# Patient Record
Sex: FEMALE | Race: WHITE | NOT HISPANIC OR LATINO | Employment: FULL TIME | URBAN - NONMETROPOLITAN AREA
[De-identification: names, ages, dates, MRNs, and addresses within clinical notes are randomized per-mention and may not be internally consistent; named-entity substitution may affect disease eponyms.]

---

## 2024-02-08 PROBLEM — D68.51 FACTOR V LEIDEN (MULTI): Status: ACTIVE | Noted: 2024-02-08

## 2024-02-08 PROBLEM — R00.2 PALPITATIONS: Status: ACTIVE | Noted: 2024-02-08

## 2024-02-08 PROBLEM — Z86.711 HISTORY OF PULMONARY EMBOLUS (PE): Status: ACTIVE | Noted: 2024-02-08

## 2024-02-08 PROBLEM — R42 LIGHT-HEADEDNESS: Status: ACTIVE | Noted: 2024-02-08

## 2024-02-08 PROBLEM — E78.5 HYPERLIPEMIA: Status: ACTIVE | Noted: 2024-02-08

## 2024-02-08 PROBLEM — G47.33 OBSTRUCTIVE SLEEP APNEA: Status: ACTIVE | Noted: 2024-02-08

## 2024-02-08 PROBLEM — I10 HYPERTENSION, BENIGN: Status: ACTIVE | Noted: 2024-02-08

## 2024-02-08 PROBLEM — R07.89 CHEST TIGHTNESS: Status: ACTIVE | Noted: 2024-02-08

## 2024-02-08 PROBLEM — R53.83 FATIGUE: Status: ACTIVE | Noted: 2024-02-08

## 2024-02-08 RX ORDER — VALACYCLOVIR HYDROCHLORIDE 1 G/1
1 TABLET, FILM COATED ORAL DAILY
COMMUNITY

## 2024-02-08 RX ORDER — CETIRIZINE HYDROCHLORIDE 10 MG/1
1 TABLET ORAL DAILY
COMMUNITY
End: 2024-05-01 | Stop reason: ALTCHOICE

## 2024-02-08 RX ORDER — LEVOTHYROXINE SODIUM 75 UG/1
1 TABLET ORAL DAILY
COMMUNITY
Start: 2021-06-14

## 2024-02-08 RX ORDER — LOSARTAN POTASSIUM 25 MG/1
1 TABLET ORAL DAILY
COMMUNITY
Start: 2022-06-14 | End: 2024-03-26

## 2024-02-08 RX ORDER — ATORVASTATIN CALCIUM 10 MG/1
1 TABLET, FILM COATED ORAL DAILY
COMMUNITY

## 2024-02-08 RX ORDER — ACETAMINOPHEN 500 MG
2000 TABLET ORAL DAILY
COMMUNITY

## 2024-03-21 DIAGNOSIS — I10 HYPERTENSION, BENIGN: ICD-10-CM

## 2024-03-26 RX ORDER — LOSARTAN POTASSIUM 25 MG/1
25 TABLET ORAL DAILY
Qty: 90 TABLET | Refills: 3 | Status: SHIPPED | OUTPATIENT
Start: 2024-03-26

## 2024-05-01 ENCOUNTER — OFFICE VISIT (OUTPATIENT)
Dept: CARDIOLOGY | Facility: CLINIC | Age: 64
End: 2024-05-01
Payer: MEDICARE

## 2024-05-01 VITALS
HEART RATE: 64 BPM | SYSTOLIC BLOOD PRESSURE: 120 MMHG | HEIGHT: 64 IN | BODY MASS INDEX: 24.75 KG/M2 | WEIGHT: 145 LBS | DIASTOLIC BLOOD PRESSURE: 90 MMHG

## 2024-05-01 DIAGNOSIS — Z86.711 HISTORY OF PULMONARY EMBOLUS (PE): ICD-10-CM

## 2024-05-01 DIAGNOSIS — D68.51 FACTOR V LEIDEN (MULTI): ICD-10-CM

## 2024-05-01 DIAGNOSIS — E78.2 MIXED HYPERLIPIDEMIA: ICD-10-CM

## 2024-05-01 DIAGNOSIS — Z78.9 NEVER SMOKED TOBACCO: ICD-10-CM

## 2024-05-01 DIAGNOSIS — I10 HYPERTENSION, BENIGN: ICD-10-CM

## 2024-05-01 PROCEDURE — 1160F RVW MEDS BY RX/DR IN RCRD: CPT | Performed by: INTERNAL MEDICINE

## 2024-05-01 PROCEDURE — 3074F SYST BP LT 130 MM HG: CPT | Performed by: INTERNAL MEDICINE

## 2024-05-01 PROCEDURE — 1159F MED LIST DOCD IN RCRD: CPT | Performed by: INTERNAL MEDICINE

## 2024-05-01 PROCEDURE — 1036F TOBACCO NON-USER: CPT | Performed by: INTERNAL MEDICINE

## 2024-05-01 PROCEDURE — 3080F DIAST BP >= 90 MM HG: CPT | Performed by: INTERNAL MEDICINE

## 2024-05-01 PROCEDURE — 99213 OFFICE O/P EST LOW 20 MIN: CPT | Performed by: INTERNAL MEDICINE

## 2024-05-01 PROCEDURE — 3008F BODY MASS INDEX DOCD: CPT | Performed by: INTERNAL MEDICINE

## 2024-05-01 ASSESSMENT — ENCOUNTER SYMPTOMS: DIZZINESS: 1

## 2024-05-01 NOTE — LETTER
"May 1, 2024     Edna Kearns MD  [address] [facility] [address]    Patient: Mirna Snider   YOB: 1960   Date of Visit: 5/1/2024       Dear Dr. Edna Kearns MD:    Thank you for referring Mirna Snider to me for evaluation. Below are my notes for this consultation.  If you have questions, please do not hesitate to call me. I look forward to following your patient along with you.       Sincerely,     Gaetano Rinaldi, DO      CC: No Recipients  ______________________________________________________________________________________    Subjective   Mirna Snider is a 63 y.o. female       Chief Complaint    Annual Exam          63-year-old female returns for annual follow-up she is doing well she has had no recurrent bleeding or thromboembolic events.  Her younger brother has sustained further lower extremity thromboembolism this past year    She sustained large pulmonary embolism in 2020 treated with catheter-based thrombolytic therapy successfully with resolution of RV and LV dysfunction. She remains on lifelong DOAC therapy. She has underlying factor V Leiden deficiency and a family history of precocious thromboembolic events.    Recommendations: Continue lifelong DOAC therapy, follow-up in 1 year         Review of Systems   Neurological:  Positive for dizziness.   All other systems reviewed and are negative.           Vitals:    05/01/24 0930   BP: 120/90   BP Location: Left arm   Patient Position: Sitting   Pulse: 64   Weight: 65.8 kg (145 lb)   Height: 1.626 m (5' 4\")        Objective   Physical Exam  Constitutional:       Appearance: Normal appearance.   HENT:      Nose: Nose normal.   Neck:      Vascular: No carotid bruit.   Cardiovascular:      Rate and Rhythm: Normal rate.      Pulses: Normal pulses.      Heart sounds: Normal heart sounds.   Pulmonary:      Effort: Pulmonary effort is normal.   Abdominal:      General: Bowel sounds are normal.      Palpations: Abdomen is soft. "   Musculoskeletal:         General: Normal range of motion.      Cervical back: Normal range of motion.      Right lower leg: No edema.      Left lower leg: No edema.   Skin:     General: Skin is warm and dry.   Neurological:      General: No focal deficit present.      Mental Status: She is alert.   Psychiatric:         Mood and Affect: Mood normal.         Behavior: Behavior normal.         Thought Content: Thought content normal.         Judgment: Judgment normal.         Allergies  Patient has no known allergies.     Current Medications    Current Outpatient Medications:   •  atorvastatin (Lipitor) 10 mg tablet, Take 1 tablet (10 mg) by mouth once daily., Disp: , Rfl:   •  cholecalciferol (Vitamin D-3) 50 mcg (2,000 unit) capsule, Take 1 capsule (50 mcg) by mouth once daily., Disp: , Rfl:   •  levothyroxine (Synthroid) 75 mcg tablet, Take 1 tablet (75 mcg) by mouth once daily., Disp: , Rfl:   •  losartan (Cozaar) 25 mg tablet, TAKE 1 TABLET BY MOUTH DAILY, Disp: 90 tablet, Rfl: 3  •  multivit-min/ferrous fumarate (MULTI VITAMIN ORAL), Take 1 tablet by mouth once daily., Disp: , Rfl:   •  rivaroxaban (Xarelto) 20 mg tablet, Take 1 tablet (20 mg) by mouth once daily., Disp: , Rfl:   •  valACYclovir (Valtrex) 1 gram tablet, Take 1 tablet (1,000 mg) by mouth once daily., Disp: , Rfl:                      Assessment/Plan   1. History of pulmonary embolus (PE)        2. Factor V Leiden (Multi)        3. Hypertension, benign        4. Mixed hyperlipidemia        5. BMI 24.0-24.9, adult        6. Never smoked tobacco                 Scribe Attestation  By signing my name below, Alyssa GRIGGS LPN [employer]   attest that this documentation has been prepared under the direction and in the presence of Gaetano Rinaldi DO.     Provider Attestation - Scribe documentation    All medical record entries made by the Scribe were at my direction and personally dictated by me. I have reviewed the chart and agree that the  record accurately reflects my personal performance of the history, physical exam, discussion and plan.

## 2024-05-01 NOTE — PROGRESS NOTES
"Ariella Snider is a 63 y.o. female       Chief Complaint    Annual Exam          63-year-old female returns for annual follow-up she is doing well she has had no recurrent bleeding or thromboembolic events.  Her younger brother has sustained further lower extremity thromboembolism this past year    She sustained large pulmonary embolism in 2020 treated with catheter-based thrombolytic therapy successfully with resolution of RV and LV dysfunction. She remains on lifelong DOAC therapy. She has underlying factor V Leiden deficiency and a family history of precocious thromboembolic events.    Recommendations: Continue lifelong DOAC therapy, follow-up in 1 year         Review of Systems   Neurological:  Positive for dizziness.   All other systems reviewed and are negative.           Vitals:    05/01/24 0930   BP: 120/90   BP Location: Left arm   Patient Position: Sitting   Pulse: 64   Weight: 65.8 kg (145 lb)   Height: 1.626 m (5' 4\")        Objective   Physical Exam  Constitutional:       Appearance: Normal appearance.   HENT:      Nose: Nose normal.   Neck:      Vascular: No carotid bruit.   Cardiovascular:      Rate and Rhythm: Normal rate.      Pulses: Normal pulses.      Heart sounds: Normal heart sounds.   Pulmonary:      Effort: Pulmonary effort is normal.   Abdominal:      General: Bowel sounds are normal.      Palpations: Abdomen is soft.   Musculoskeletal:         General: Normal range of motion.      Cervical back: Normal range of motion.      Right lower leg: No edema.      Left lower leg: No edema.   Skin:     General: Skin is warm and dry.   Neurological:      General: No focal deficit present.      Mental Status: She is alert.   Psychiatric:         Mood and Affect: Mood normal.         Behavior: Behavior normal.         Thought Content: Thought content normal.         Judgment: Judgment normal.         Allergies  Patient has no known allergies.     Current Medications    Current Outpatient " Medications:     atorvastatin (Lipitor) 10 mg tablet, Take 1 tablet (10 mg) by mouth once daily., Disp: , Rfl:     cholecalciferol (Vitamin D-3) 50 mcg (2,000 unit) capsule, Take 1 capsule (50 mcg) by mouth once daily., Disp: , Rfl:     levothyroxine (Synthroid) 75 mcg tablet, Take 1 tablet (75 mcg) by mouth once daily., Disp: , Rfl:     losartan (Cozaar) 25 mg tablet, TAKE 1 TABLET BY MOUTH DAILY, Disp: 90 tablet, Rfl: 3    multivit-min/ferrous fumarate (MULTI VITAMIN ORAL), Take 1 tablet by mouth once daily., Disp: , Rfl:     rivaroxaban (Xarelto) 20 mg tablet, Take 1 tablet (20 mg) by mouth once daily., Disp: , Rfl:     valACYclovir (Valtrex) 1 gram tablet, Take 1 tablet (1,000 mg) by mouth once daily., Disp: , Rfl:                      Assessment/Plan   1. History of pulmonary embolus (PE)        2. Factor V Leiden (Multi)        3. Hypertension, benign        4. Mixed hyperlipidemia        5. BMI 24.0-24.9, adult        6. Never smoked tobacco                 Scribe Attestation  By signing my name below, IAlyssa LPN, Scribe   attest that this documentation has been prepared under the direction and in the presence of Gaetano Rinaldi DO.     Provider Attestation - Scribe documentation    All medical record entries made by the Scribe were at my direction and personally dictated by me. I have reviewed the chart and agree that the record accurately reflects my personal performance of the history, physical exam, discussion and plan.

## 2025-05-01 ENCOUNTER — APPOINTMENT (OUTPATIENT)
Dept: CARDIOLOGY | Facility: CLINIC | Age: 65
End: 2025-05-01
Payer: COMMERCIAL

## 2025-05-30 DIAGNOSIS — I10 HYPERTENSION, BENIGN: ICD-10-CM

## 2025-06-02 RX ORDER — LOSARTAN POTASSIUM 25 MG/1
25 TABLET ORAL DAILY
Qty: 90 TABLET | Refills: 3 | Status: SHIPPED | OUTPATIENT
Start: 2025-06-02 | End: 2026-06-02

## 2025-08-07 ENCOUNTER — APPOINTMENT (OUTPATIENT)
Dept: CARDIOLOGY | Facility: CLINIC | Age: 65
End: 2025-08-07
Payer: MEDICARE

## 2025-08-07 VITALS
BODY MASS INDEX: 26.12 KG/M2 | HEART RATE: 64 BPM | WEIGHT: 153 LBS | SYSTOLIC BLOOD PRESSURE: 128 MMHG | DIASTOLIC BLOOD PRESSURE: 82 MMHG | HEIGHT: 64 IN

## 2025-08-07 DIAGNOSIS — D68.51 FACTOR V LEIDEN (MULTI): ICD-10-CM

## 2025-08-07 DIAGNOSIS — Z86.711 HISTORY OF PULMONARY EMBOLUS (PE): ICD-10-CM

## 2025-08-07 DIAGNOSIS — G47.33 OBSTRUCTIVE SLEEP APNEA: ICD-10-CM

## 2025-08-07 DIAGNOSIS — I10 HYPERTENSION, BENIGN: ICD-10-CM

## 2025-08-07 DIAGNOSIS — Z78.9 NEVER SMOKED TOBACCO: ICD-10-CM

## 2025-08-07 PROCEDURE — 1159F MED LIST DOCD IN RCRD: CPT | Performed by: INTERNAL MEDICINE

## 2025-08-07 PROCEDURE — 1036F TOBACCO NON-USER: CPT | Performed by: INTERNAL MEDICINE

## 2025-08-07 PROCEDURE — 3074F SYST BP LT 130 MM HG: CPT | Performed by: INTERNAL MEDICINE

## 2025-08-07 PROCEDURE — 3079F DIAST BP 80-89 MM HG: CPT | Performed by: INTERNAL MEDICINE

## 2025-08-07 PROCEDURE — 3008F BODY MASS INDEX DOCD: CPT | Performed by: INTERNAL MEDICINE

## 2025-08-07 PROCEDURE — 1160F RVW MEDS BY RX/DR IN RCRD: CPT | Performed by: INTERNAL MEDICINE

## 2025-08-07 PROCEDURE — 99213 OFFICE O/P EST LOW 20 MIN: CPT | Performed by: INTERNAL MEDICINE

## 2025-08-07 RX ORDER — ATORVASTATIN CALCIUM 20 MG/1
20 TABLET, FILM COATED ORAL DAILY
COMMUNITY

## 2025-08-07 NOTE — LETTER
"August 7, 2025     Edna Kearns MD  [address] [facility] [address]    Patient: Mirna Snider   YOB: 1960   Date of Visit: 8/7/2025       Dear Dr. Edna Kearns MD:    Thank you for referring Mirna Snider to me for evaluation. Below are my notes for this consultation.  If you have questions, please do not hesitate to call me. I look forward to following your patient along with you.       Sincerely,     Gaetano Rinaldi, DO      CC: No Recipients  ______________________________________________________________________________________    Chief Complaint   Patient presents with   • Follow-up     1 year Follow up for Hypertension        Subjective   Mirna Snider is a 64 y.o. female     64-year-old female returns for annual cardiovascular follow-up and doing very well.  She denies any cardiovascular events or complaints or nitrate usage or hospitalizations.  She underwent inspire treatment for sleep apnea with improved blood pressure and now is off losartan altogether given the normalization of her blood pressure.  She still uses oxygen at night for hypoxemia.    She remains on lifelong Xarelto given her history of thromboembolism and factor V deficiency.    She is otherwise functional class I presently    Recommendations: Continue current therapies, follow-up in 1 year         Review of Systems   All other systems reviewed and are negative.           Vitals:    08/07/25 1136   BP: 128/82   BP Location: Left arm   Patient Position: Sitting   Pulse: 64   Weight: 69.4 kg (153 lb)   Height: 1.626 m (5' 4\")        Objective   Physical Exam  Constitutional:       Appearance: Normal appearance.   HENT:      Nose: Nose normal.   Neck:      Vascular: No carotid bruit.     Cardiovascular:      Rate and Rhythm: Normal rate.      Pulses: Normal pulses.      Heart sounds: Normal heart sounds.   Pulmonary:      Effort: Pulmonary effort is normal.   Abdominal:      General: Bowel sounds are normal.      " Palpations: Abdomen is soft.     Musculoskeletal:         General: Normal range of motion.      Cervical back: Normal range of motion.      Right lower leg: No edema.      Left lower leg: No edema.     Skin:     General: Skin is warm and dry.     Neurological:      General: No focal deficit present.      Mental Status: She is alert.     Psychiatric:         Mood and Affect: Mood normal.         Behavior: Behavior normal.         Thought Content: Thought content normal.         Judgment: Judgment normal.         Allergies  Patient has no known allergies.     Current Medications  Current Outpatient Medications   Medication Instructions   • atorvastatin (LIPITOR) 20 mg, Daily   • cholecalciferol (VITAMIN D-3) 2,000 Units, Daily   • levothyroxine (Synthroid) 75 mcg tablet 1 tablet, Daily   • multivit-min/ferrous fumarate (MULTI VITAMIN ORAL) 1 tablet, Daily   • oxygen (O2) 2 L/min, Nightly   • rivaroxaban (Xarelto) 20 mg tablet 1 tablet, Daily   • valACYclovir (Valtrex) 1 gram tablet 1 tablet, Daily                        Assessment/Plan   1. History of pulmonary embolus (PE)  Follow Up In Cardiology      2. Factor V Leiden (Multi)  Follow Up In Cardiology      3. Obstructive sleep apnea        4. Hypertension, benign        5. BMI 26.0-26.9,adult        6. Never smoked tobacco                 Scribe Attestation  By signing my name below, ICatherine LPN, Scribe   attest that this documentation has been prepared under the direction and in the presence of Gaetano Rinaldi DO.     Provider Attestation - Scribe documentation    All medical record entries made by the Scribe were at my direction and personally dictated by me. I have reviewed the chart and agree that the record accurately reflects my personal performance of the history, physical exam, discussion and plan.

## 2025-08-07 NOTE — PATIENT INSTRUCTIONS
Please bring all medicines, vitamins, and herbal supplements with you when you come to the office.    Prescriptions will not be filled unless you are compliant with your follow up appointments or have a follow up appointment scheduled as per instruction of your physician. Refills should be requested at the time of your visit.     BMI was above normal measurement. Current weight: 69.4 kg (153 lb)  Weight change since last visit (-) denotes wt loss 8 lbs   Weight loss needed to achieve BMI 25: 7.7 Lbs  Weight loss needed to achieve BMI 30: -21.4 Lbs  Provided instructions on dietary changes  Provided instructions on exercise.    **IF SYSTOLIC BLOOD PRESSURE READING  OR ABOVE, TAKE LOSARTAN**

## 2025-08-07 NOTE — PROGRESS NOTES
"Chief Complaint   Patient presents with    Follow-up     1 year Follow up for Hypertension        Subjective   Mirna Snider is a 64 y.o. female     64-year-old female returns for annual cardiovascular follow-up and doing very well.  She denies any cardiovascular events or complaints or nitrate usage or hospitalizations.  She underwent inspire treatment for sleep apnea with improved blood pressure and now is off losartan altogether given the normalization of her blood pressure.  She still uses oxygen at night for hypoxemia.    She remains on lifelong Xarelto given her history of thromboembolism and factor V deficiency.    She is otherwise functional class I presently    Recommendations: Continue current therapies, follow-up in 1 year         Review of Systems   All other systems reviewed and are negative.           Vitals:    08/07/25 1136   BP: 128/82   BP Location: Left arm   Patient Position: Sitting   Pulse: 64   Weight: 69.4 kg (153 lb)   Height: 1.626 m (5' 4\")        Objective   Physical Exam  Constitutional:       Appearance: Normal appearance.   HENT:      Nose: Nose normal.   Neck:      Vascular: No carotid bruit.     Cardiovascular:      Rate and Rhythm: Normal rate.      Pulses: Normal pulses.      Heart sounds: Normal heart sounds.   Pulmonary:      Effort: Pulmonary effort is normal.   Abdominal:      General: Bowel sounds are normal.      Palpations: Abdomen is soft.     Musculoskeletal:         General: Normal range of motion.      Cervical back: Normal range of motion.      Right lower leg: No edema.      Left lower leg: No edema.     Skin:     General: Skin is warm and dry.     Neurological:      General: No focal deficit present.      Mental Status: She is alert.     Psychiatric:         Mood and Affect: Mood normal.         Behavior: Behavior normal.         Thought Content: Thought content normal.         Judgment: Judgment normal.         Allergies  Patient has no known allergies.     Current " Medications  Current Outpatient Medications   Medication Instructions    atorvastatin (LIPITOR) 20 mg, Daily    cholecalciferol (VITAMIN D-3) 2,000 Units, Daily    levothyroxine (Synthroid) 75 mcg tablet 1 tablet, Daily    multivit-min/ferrous fumarate (MULTI VITAMIN ORAL) 1 tablet, Daily    oxygen (O2) 2 L/min, Nightly    rivaroxaban (Xarelto) 20 mg tablet 1 tablet, Daily    valACYclovir (Valtrex) 1 gram tablet 1 tablet, Daily                        Assessment/Plan   1. History of pulmonary embolus (PE)  Follow Up In Cardiology      2. Factor V Leiden (Multi)  Follow Up In Cardiology      3. Obstructive sleep apnea        4. Hypertension, benign        5. BMI 26.0-26.9,adult        6. Never smoked tobacco                 Scribe Attestation  By signing my name below, ICatherine LPN , Maty   attest that this documentation has been prepared under the direction and in the presence of Gaetano Rinaldi DO.     Provider Attestation - Scribe documentation    All medical record entries made by the Scribe were at my direction and personally dictated by me. I have reviewed the chart and agree that the record accurately reflects my personal performance of the history, physical exam, discussion and plan.

## 2026-08-11 ENCOUNTER — APPOINTMENT (OUTPATIENT)
Dept: CARDIOLOGY | Facility: CLINIC | Age: 66
End: 2026-08-11
Payer: MEDICARE